# Patient Record
Sex: MALE | Race: WHITE | NOT HISPANIC OR LATINO | Employment: OTHER | URBAN - METROPOLITAN AREA
[De-identification: names, ages, dates, MRNs, and addresses within clinical notes are randomized per-mention and may not be internally consistent; named-entity substitution may affect disease eponyms.]

---

## 2020-05-21 RX ORDER — TADALAFIL 5 MG/1
5 TABLET ORAL DAILY PRN
Qty: 30 TABLET | Refills: 0 | Status: SHIPPED | OUTPATIENT
Start: 2020-05-21 | End: 2020-07-01 | Stop reason: SDUPTHER

## 2020-07-01 ENCOUNTER — OFFICE VISIT (OUTPATIENT)
Dept: UROLOGY | Facility: CLINIC | Age: 55
End: 2020-07-01
Payer: COMMERCIAL

## 2020-07-01 VITALS — DIASTOLIC BLOOD PRESSURE: 82 MMHG | HEART RATE: 106 BPM | SYSTOLIC BLOOD PRESSURE: 140 MMHG

## 2020-07-01 DIAGNOSIS — N40.0 BENIGN PROSTATIC HYPERPLASIA, UNSPECIFIED WHETHER LOWER URINARY TRACT SYMPTOMS PRESENT: Primary | ICD-10-CM

## 2020-07-01 LAB
POC RESIDUAL URINE VOLUME: 22 ML (ref 0–100)
PSA, DIAGNOSTIC: 1.21 NG/ML (ref 0–4)

## 2020-07-01 PROCEDURE — 51798 US URINE CAPACITY MEASURE: CPT | Mod: S$GLB,,, | Performed by: NURSE PRACTITIONER

## 2020-07-01 PROCEDURE — 99214 OFFICE O/P EST MOD 30 MIN: CPT | Mod: S$GLB,,, | Performed by: NURSE PRACTITIONER

## 2020-07-01 PROCEDURE — 51798 POCT BLADDER SCAN: ICD-10-PCS | Mod: S$GLB,,, | Performed by: NURSE PRACTITIONER

## 2020-07-01 PROCEDURE — 99214 PR OFFICE/OUTPT VISIT, EST, LEVL IV, 30-39 MIN: ICD-10-PCS | Mod: S$GLB,,, | Performed by: NURSE PRACTITIONER

## 2020-07-01 RX ORDER — AMLODIPINE AND BENAZEPRIL HYDROCHLORIDE 10; 40 MG/1; MG/1
CAPSULE ORAL
COMMUNITY
Start: 2020-06-30

## 2020-07-01 RX ORDER — TADALAFIL 5 MG/1
5 TABLET ORAL DAILY PRN
Qty: 30 TABLET | Refills: 0 | Status: SHIPPED | OUTPATIENT
Start: 2020-07-01 | End: 2020-07-29

## 2020-07-01 RX ORDER — FLUTICASONE FUROATE AND VILANTEROL TRIFENATATE 100; 25 UG/1; UG/1
POWDER RESPIRATORY (INHALATION)
COMMUNITY
Start: 2020-05-27 | End: 2023-07-07

## 2020-07-01 RX ORDER — TAMSULOSIN HYDROCHLORIDE 0.4 MG/1
0.4 CAPSULE ORAL DAILY
Qty: 90 CAPSULE | Refills: 3 | Status: SHIPPED | OUTPATIENT
Start: 2020-07-01 | End: 2021-06-29

## 2020-07-01 RX ORDER — PANTOPRAZOLE SODIUM 40 MG/1
TABLET, DELAYED RELEASE ORAL
COMMUNITY
Start: 2020-06-05

## 2020-07-01 NOTE — PROGRESS NOTES
Subjective:       Patient ID: Smooth Powell is a 55 y.o. male.    Chief Complaint: Other (yearly )      HPI: 55-year-old male known service Dr. Smith yearly follow-up history of BPH with obstruction and erectile dysfunction.  He is on Flomax 0.4 mg daily doing well.  No complaints about his urinary stream her voiding pattern.  Denies dysuria, frequency, urgency, incontinence or gross hematuria.  Erectile dysfunction--on Cialis 5 mg daily with good results.  Patient is testosterone was evaluated in the past that was slightly low but patient declined intervention.  Again today states he feels well with good libido good energy level and stamina.  He declines any further evaluation of that.  PSA was 1.4 in April of 2019 and 1.6 in March of 2018       Past Medical History:   Past Medical History:   Diagnosis Date    BPH with urinary obstruction     Erectile dysfunction        Past Surgical Historical:   Past Surgical History:   Procedure Laterality Date    NASAL SINUS SURGERY      TURBINATE RESECTION      VASECTOMY          Medications:   Medication List with Changes/Refills   Current Medications    AMLODIPINE-BENAZEPRIL (LOTREL) 10-40 MG PER CAPSULE        BREO ELLIPTA 100-25 MCG/DOSE DISKUS INHALER        PANTOPRAZOLE (PROTONIX) 40 MG TABLET        TADALAFIL (CIALIS) 5 MG TABLET    Take 1 tablet (5 mg total) by mouth daily as needed for Erectile Dysfunction.    TAMSULOSIN HCL (FLOMAX ORAL)    Take by mouth.        Past Social History:   Social History     Socioeconomic History    Marital status:      Spouse name: Not on file    Number of children: Not on file    Years of education: Not on file    Highest education level: Not on file   Occupational History    Not on file   Social Needs    Financial resource strain: Not on file    Food insecurity     Worry: Not on file     Inability: Not on file    Transportation needs     Medical: Not on file     Non-medical: Not on file   Tobacco Use    Smoking  status: Never Smoker    Smokeless tobacco: Never Used   Substance and Sexual Activity    Alcohol use: Yes     Frequency: Monthly or less    Drug use: Not on file    Sexual activity: Not on file   Lifestyle    Physical activity     Days per week: Not on file     Minutes per session: Not on file    Stress: Not on file   Relationships    Social connections     Talks on phone: Not on file     Gets together: Not on file     Attends [employer] service: Not on file     Active member of club or organization: Not on file     Attends meetings of clubs or organizations: Not on file     Relationship status: Not on file   Other Topics Concern    Not on file   Social History Narrative    Not on file       Allergies: Review of patient's allergies indicates:  No Known Allergies     Family History: History reviewed. No pertinent family history.     Review of Systems:  Review of Systems   Constitutional: Negative for activity change and appetite change.   HENT: Negative for congestion and dental problem.    Eyes: Negative for visual disturbance.   Respiratory: Negative for chest tightness and shortness of breath.    Cardiovascular: Negative for chest pain.   Gastrointestinal: Negative for abdominal distention and abdominal pain.   Genitourinary: Negative for decreased urine volume, difficulty urinating, discharge, dysuria, enuresis, flank pain, frequency, genital sores, hematuria, penile pain, penile swelling, scrotal swelling, testicular pain and urgency.   Musculoskeletal: Negative for back pain and neck pain.   Skin: Negative for color change.   Neurological: Negative for dizziness.   Hematological: Negative for adenopathy.   Psychiatric/Behavioral: Negative for agitation, behavioral problems and confusion.       Physical Exam:  Physical Exam   Constitutional: He is oriented to person, place, and time. He appears well-developed.   HENT:   Head: Normocephalic.   Eyes: No scleral icterus.   Neck: Normal range of motion.    Pulmonary/Chest: Effort normal and breath sounds normal.   Abdominal: Soft. He exhibits no distension. There is no abdominal tenderness. No hernia. Hernia confirmed negative in the right inguinal area and confirmed negative in the left inguinal area.   Genitourinary:    Testes and penis normal.   Prostate is enlarged ( symmetrical, smooth benign-feeling). Cremasteric reflex is present.   Neurological: He is alert and oriented to person, place, and time.   Skin: Skin is warm and dry.         Assessment/Plan:   BPH with LUTS--PVR 22 mL.  Refill Flomax.  Serum PSA today    Erectile dysfunction--refill Cialis    Problem List Items Addressed This Visit     None      Visit Diagnoses     Benign prostatic hyperplasia, unspecified whether lower urinary tract symptoms present    -  Primary    Relevant Orders    POCT Bladder Scan    Prostate Specific Antigen, Diagnostic

## 2020-09-09 RX ORDER — TADALAFIL 5 MG/1
5 TABLET ORAL DAILY PRN
Qty: 30 TABLET | Refills: 11 | Status: SHIPPED | OUTPATIENT
Start: 2020-09-09 | End: 2021-07-06 | Stop reason: SDUPTHER

## 2021-07-06 ENCOUNTER — OFFICE VISIT (OUTPATIENT)
Dept: UROLOGY | Facility: CLINIC | Age: 56
End: 2021-07-06
Payer: COMMERCIAL

## 2021-07-06 VITALS — HEIGHT: 72 IN | BODY MASS INDEX: 25.06 KG/M2 | WEIGHT: 185 LBS

## 2021-07-06 DIAGNOSIS — N40.0 BENIGN PROSTATIC HYPERPLASIA, UNSPECIFIED WHETHER LOWER URINARY TRACT SYMPTOMS PRESENT: Primary | ICD-10-CM

## 2021-07-06 DIAGNOSIS — N52.9 ERECTILE DYSFUNCTION, UNSPECIFIED ERECTILE DYSFUNCTION TYPE: ICD-10-CM

## 2021-07-06 LAB — PSA, DIAGNOSTIC: 1.11 NG/ML (ref 0–4)

## 2021-07-06 PROCEDURE — 3008F PR BODY MASS INDEX (BMI) DOCUMENTED: ICD-10-PCS | Mod: CPTII,S$GLB,, | Performed by: NURSE PRACTITIONER

## 2021-07-06 PROCEDURE — 99214 PR OFFICE/OUTPT VISIT, EST, LEVL IV, 30-39 MIN: ICD-10-PCS | Mod: S$GLB,,, | Performed by: NURSE PRACTITIONER

## 2021-07-06 PROCEDURE — 3008F BODY MASS INDEX DOCD: CPT | Mod: CPTII,S$GLB,, | Performed by: NURSE PRACTITIONER

## 2021-07-06 PROCEDURE — 99214 OFFICE O/P EST MOD 30 MIN: CPT | Mod: S$GLB,,, | Performed by: NURSE PRACTITIONER

## 2021-07-06 RX ORDER — TADALAFIL 5 MG/1
5 TABLET ORAL DAILY
Qty: 90 TABLET | Refills: 3 | Status: SHIPPED | OUTPATIENT
Start: 2021-07-06 | End: 2022-07-06

## 2021-07-06 RX ORDER — TAMSULOSIN HYDROCHLORIDE 0.4 MG/1
1 CAPSULE ORAL DAILY
Qty: 90 CAPSULE | Refills: 3 | Status: SHIPPED | OUTPATIENT
Start: 2021-07-06 | End: 2021-09-22

## 2021-07-06 RX ORDER — FLUTICASONE PROPIONATE AND SALMETEROL 250; 50 UG/1; UG/1
POWDER RESPIRATORY (INHALATION)
COMMUNITY
Start: 2021-07-03 | End: 2023-07-07

## 2021-09-02 ENCOUNTER — DOCUMENTATION ONLY (OUTPATIENT)
Dept: UROLOGY | Facility: CLINIC | Age: 56
End: 2021-09-02

## 2022-07-07 ENCOUNTER — OFFICE VISIT (OUTPATIENT)
Dept: UROLOGY | Facility: CLINIC | Age: 57
End: 2022-07-07
Payer: COMMERCIAL

## 2022-07-07 VITALS
HEIGHT: 72 IN | SYSTOLIC BLOOD PRESSURE: 137 MMHG | DIASTOLIC BLOOD PRESSURE: 89 MMHG | WEIGHT: 185 LBS | BODY MASS INDEX: 25.06 KG/M2

## 2022-07-07 DIAGNOSIS — N13.8 BPH WITH URINARY OBSTRUCTION: Primary | ICD-10-CM

## 2022-07-07 DIAGNOSIS — N52.9 ERECTILE DYSFUNCTION, UNSPECIFIED ERECTILE DYSFUNCTION TYPE: ICD-10-CM

## 2022-07-07 DIAGNOSIS — N40.1 BPH WITH URINARY OBSTRUCTION: Primary | ICD-10-CM

## 2022-07-07 LAB
POC RESIDUAL URINE VOLUME: 23 ML (ref 0–100)
PSA, DIAGNOSTIC: 1.27 NG/ML (ref 0–4)

## 2022-07-07 PROCEDURE — 1159F MED LIST DOCD IN RCRD: CPT | Mod: CPTII,S$GLB,, | Performed by: NURSE PRACTITIONER

## 2022-07-07 PROCEDURE — 1160F RVW MEDS BY RX/DR IN RCRD: CPT | Mod: CPTII,S$GLB,, | Performed by: NURSE PRACTITIONER

## 2022-07-07 PROCEDURE — 3079F PR MOST RECENT DIASTOLIC BLOOD PRESSURE 80-89 MM HG: ICD-10-PCS | Mod: CPTII,S$GLB,, | Performed by: NURSE PRACTITIONER

## 2022-07-07 PROCEDURE — 3075F SYST BP GE 130 - 139MM HG: CPT | Mod: CPTII,S$GLB,, | Performed by: NURSE PRACTITIONER

## 2022-07-07 PROCEDURE — 1159F PR MEDICATION LIST DOCUMENTED IN MEDICAL RECORD: ICD-10-PCS | Mod: CPTII,S$GLB,, | Performed by: NURSE PRACTITIONER

## 2022-07-07 PROCEDURE — 3079F DIAST BP 80-89 MM HG: CPT | Mod: CPTII,S$GLB,, | Performed by: NURSE PRACTITIONER

## 2022-07-07 PROCEDURE — 51798 POCT BLADDER SCAN: ICD-10-PCS | Mod: S$GLB,,, | Performed by: NURSE PRACTITIONER

## 2022-07-07 PROCEDURE — 1160F PR REVIEW ALL MEDS BY PRESCRIBER/CLIN PHARMACIST DOCUMENTED: ICD-10-PCS | Mod: CPTII,S$GLB,, | Performed by: NURSE PRACTITIONER

## 2022-07-07 PROCEDURE — 99214 OFFICE O/P EST MOD 30 MIN: CPT | Mod: S$GLB,,, | Performed by: NURSE PRACTITIONER

## 2022-07-07 PROCEDURE — 3008F BODY MASS INDEX DOCD: CPT | Mod: CPTII,S$GLB,, | Performed by: NURSE PRACTITIONER

## 2022-07-07 PROCEDURE — 3075F PR MOST RECENT SYSTOLIC BLOOD PRESS GE 130-139MM HG: ICD-10-PCS | Mod: CPTII,S$GLB,, | Performed by: NURSE PRACTITIONER

## 2022-07-07 PROCEDURE — 4010F ACE/ARB THERAPY RXD/TAKEN: CPT | Mod: CPTII,S$GLB,, | Performed by: NURSE PRACTITIONER

## 2022-07-07 PROCEDURE — 4010F PR ACE/ARB THEARPY RXD/TAKEN: ICD-10-PCS | Mod: CPTII,S$GLB,, | Performed by: NURSE PRACTITIONER

## 2022-07-07 PROCEDURE — 3008F PR BODY MASS INDEX (BMI) DOCUMENTED: ICD-10-PCS | Mod: CPTII,S$GLB,, | Performed by: NURSE PRACTITIONER

## 2022-07-07 PROCEDURE — 51798 US URINE CAPACITY MEASURE: CPT | Mod: S$GLB,,, | Performed by: NURSE PRACTITIONER

## 2022-07-07 PROCEDURE — 99214 PR OFFICE/OUTPT VISIT, EST, LEVL IV, 30-39 MIN: ICD-10-PCS | Mod: S$GLB,,, | Performed by: NURSE PRACTITIONER

## 2022-07-07 RX ORDER — CLONIDINE HYDROCHLORIDE 0.2 MG/1
0.2 TABLET ORAL 2 TIMES DAILY
COMMUNITY
Start: 2022-04-28

## 2022-07-07 RX ORDER — TADALAFIL 5 MG/1
5 TABLET ORAL DAILY PRN
Qty: 90 TABLET | Refills: 3 | Status: SHIPPED | OUTPATIENT
Start: 2022-07-07 | End: 2022-08-26

## 2022-07-07 RX ORDER — TAMSULOSIN HYDROCHLORIDE 0.4 MG/1
0.4 CAPSULE ORAL DAILY
Qty: 90 CAPSULE | Refills: 3 | Status: SHIPPED | OUTPATIENT
Start: 2022-07-07 | End: 2023-07-07

## 2022-07-07 NOTE — PROGRESS NOTES
Subjective:       Patient ID: Smooth Powell is a 57 y.o. male.    Chief Complaint: Benign Prostatic Hypertrophy      HPI: 57-year-old male, established patient, presents for yearly visit.  Patient has history BPH with obstruction and erectile dysfunction.  Patient is on Flomax 0.4 mg daily and generic Cialis 5 mg daily.    Patient states he is doing well.  States he is doing well on both medications.  Denies any pain or burning urination.  States he has a good stream from start to finish.  Denies having strain to void.  Denies any significant frequency, urgency, or nocturia.  Denies any significant weight loss.  Denies any blood in urine.  Denies any fever body aches.  States his erections are good with Cialis 5 mg daily.    No other urinary complaints at this time.       Past Medical History:   Past Medical History:   Diagnosis Date    BPH with urinary obstruction     Erectile dysfunction        Past Surgical Historical:   Past Surgical History:   Procedure Laterality Date    NASAL SINUS SURGERY      TURBINATE RESECTION      VASECTOMY          Medications:   Medication List with Changes/Refills   New Medications    TADALAFIL (CIALIS) 5 MG TABLET    Take 1 tablet (5 mg total) by mouth daily as needed for Erectile Dysfunction.    TAMSULOSIN (FLOMAX) 0.4 MG CAP    Take 1 capsule (0.4 mg total) by mouth once daily.   Current Medications    AMLODIPINE-BENAZEPRIL (LOTREL) 10-40 MG PER CAPSULE        BREO ELLIPTA 100-25 MCG/DOSE DISKUS INHALER        CLONIDINE (CATAPRES) 0.2 MG TABLET    Take 0.2 mg by mouth 2 (two) times daily.    FLUTICASONE-SALMETEROL DISKUS INHALER 250-50 MCG        PANTOPRAZOLE (PROTONIX) 40 MG TABLET        TADALAFIL (CIALIS) 5 MG TABLET    Take 1 tablet (5 mg total) by mouth Daily.   Changed and/or Refilled Medications    Modified Medication Previous Medication    TAMSULOSIN (FLOMAX) 0.4 MG CAP tamsulosin (FLOMAX) 0.4 mg Cap       Take 1 capsule by mouth once daily    Take 1 capsule by mouth  once daily        Past Social History:   Social History     Socioeconomic History    Marital status:    Tobacco Use    Smoking status: Never Smoker    Smokeless tobacco: Never Used   Substance and Sexual Activity    Alcohol use: Yes       Allergies: Review of patient's allergies indicates:  No Known Allergies     Family History: History reviewed. No pertinent family history.     Review of Systems:  Review of Systems   Constitutional: Negative for activity change and appetite change.   HENT: Negative for congestion and dental problem.    Eyes: Negative for visual disturbance.   Respiratory: Negative for chest tightness and shortness of breath.    Cardiovascular: Negative for chest pain.   Gastrointestinal: Negative for abdominal distention and abdominal pain.   Genitourinary: Negative for decreased urine volume, difficulty urinating, dysuria, enuresis, flank pain, frequency, genital sores, hematuria, penile discharge, penile pain, penile swelling, scrotal swelling, testicular pain and urgency.   Musculoskeletal: Negative for back pain and neck pain.   Skin: Negative for color change.   Neurological: Negative for dizziness.   Hematological: Negative for adenopathy.   Psychiatric/Behavioral: Negative for agitation, behavioral problems and confusion.       Physical Exam:  Physical Exam  Vitals and nursing note reviewed.   Constitutional:       Appearance: He is well-developed.   HENT:      Head: Normocephalic.   Eyes:      Pupils: Pupils are equal, round, and reactive to light.   Cardiovascular:      Rate and Rhythm: Normal rate and regular rhythm.      Heart sounds: Normal heart sounds.   Pulmonary:      Effort: Pulmonary effort is normal.      Breath sounds: Normal breath sounds.   Abdominal:      General: Bowel sounds are normal.      Palpations: Abdomen is soft.   Genitourinary:     Penis: Normal.       Prostate: Enlarged.      Rectum: Normal.      Comments: Prostate is enlarged.  Prostate smooth with no  nodules and nontender.  Prostate is symmetrical.  Musculoskeletal:         General: Normal range of motion.      Cervical back: Normal range of motion and neck supple.   Skin:     General: Skin is warm and dry.   Neurological:      Mental Status: He is alert and oriented to person, place, and time.   Psychiatric:         Behavior: Behavior normal.       Bladder scan:  23 cc    Assessment/Plan:   1. BPH with obstruction:  Will check PSA.  We will notify patient of the results.  Patient is doing Flomax 0.4 mg daily and 5 mg Cialis daily.  Patient will continue medications as directed.    2. Erectile dysfunction:  Patient responding well to Cialis 5 mg daily.  Patient continue as directed.    Plan follow-up in 1 year, sooner if needed.  Problem List Items Addressed This Visit    None     Visit Diagnoses     BPH with urinary obstruction    -  Primary    Relevant Medications    tadalafiL (CIALIS) 5 MG tablet    tamsulosin (FLOMAX) 0.4 mg Cap    Other Relevant Orders    Prostate Specific Antigen, Diagnostic    POCT Bladder Scan (Completed)    Erectile dysfunction, unspecified erectile dysfunction type        Relevant Medications    tadalafiL (CIALIS) 5 MG tablet

## 2022-07-08 ENCOUNTER — TELEPHONE (OUTPATIENT)
Dept: UROLOGY | Facility: CLINIC | Age: 57
End: 2022-07-08
Payer: COMMERCIAL

## 2022-07-08 NOTE — TELEPHONE ENCOUNTER
Notified pt of psa results. Pt stated that he understood. ED----- Message from Lupillo Baptiste NP sent at 7/7/2022 12:56 PM CDT -----  PSA is WNL

## 2023-01-30 ENCOUNTER — TELEPHONE (OUTPATIENT)
Dept: UROLOGY | Facility: CLINIC | Age: 58
End: 2023-01-30
Payer: COMMERCIAL

## 2023-01-30 RX ORDER — TADALAFIL 20 MG/1
20 TABLET ORAL DAILY PRN
Qty: 10 TABLET | Refills: 11 | Status: SHIPPED | OUTPATIENT
Start: 2023-01-30 | End: 2023-07-07

## 2023-01-30 NOTE — TELEPHONE ENCOUNTER
Called patient and made aware to take 20 mg medication as needed only not a daily medication. Patient verbalized understanding.

## 2023-01-30 NOTE — TELEPHONE ENCOUNTER
----- Message from Palak Boykin sent at 1/30/2023  8:05 AM CST -----  Contact: pt  Pt calling about tadalafiL (CIALIS) 5 MG.  This script is not working and would like it increased to about 20 mg.  Pt can be reached at [phone]  Pt would like to keep 90 day supply      [facility] [address] [place], [place] - [address] [facility]  [address] [address]  Phone: [phone] Fax: [phone]    Thanks,

## 2023-07-07 ENCOUNTER — OFFICE VISIT (OUTPATIENT)
Dept: UROLOGY | Facility: CLINIC | Age: 58
End: 2023-07-07
Payer: COMMERCIAL

## 2023-07-07 VITALS — DIASTOLIC BLOOD PRESSURE: 84 MMHG | HEART RATE: 85 BPM | SYSTOLIC BLOOD PRESSURE: 137 MMHG

## 2023-07-07 DIAGNOSIS — N40.1 BPH WITH URINARY OBSTRUCTION: Primary | ICD-10-CM

## 2023-07-07 DIAGNOSIS — R35.1 NOCTURIA: ICD-10-CM

## 2023-07-07 DIAGNOSIS — N13.8 BPH WITH URINARY OBSTRUCTION: Primary | ICD-10-CM

## 2023-07-07 DIAGNOSIS — N52.9 ERECTILE DYSFUNCTION, UNSPECIFIED ERECTILE DYSFUNCTION TYPE: ICD-10-CM

## 2023-07-07 LAB — PSA, DIAGNOSTIC: 1.48 NG/ML (ref 0.1–4)

## 2023-07-07 PROCEDURE — 3079F DIAST BP 80-89 MM HG: CPT | Mod: CPTII,S$GLB,, | Performed by: NURSE PRACTITIONER

## 2023-07-07 PROCEDURE — 3075F SYST BP GE 130 - 139MM HG: CPT | Mod: CPTII,S$GLB,, | Performed by: NURSE PRACTITIONER

## 2023-07-07 PROCEDURE — 3075F PR MOST RECENT SYSTOLIC BLOOD PRESS GE 130-139MM HG: ICD-10-PCS | Mod: CPTII,S$GLB,, | Performed by: NURSE PRACTITIONER

## 2023-07-07 PROCEDURE — 99214 OFFICE O/P EST MOD 30 MIN: CPT | Mod: S$GLB,,, | Performed by: NURSE PRACTITIONER

## 2023-07-07 PROCEDURE — 3079F PR MOST RECENT DIASTOLIC BLOOD PRESSURE 80-89 MM HG: ICD-10-PCS | Mod: CPTII,S$GLB,, | Performed by: NURSE PRACTITIONER

## 2023-07-07 PROCEDURE — 4010F ACE/ARB THERAPY RXD/TAKEN: CPT | Mod: CPTII,S$GLB,, | Performed by: NURSE PRACTITIONER

## 2023-07-07 PROCEDURE — 4010F PR ACE/ARB THEARPY RXD/TAKEN: ICD-10-PCS | Mod: CPTII,S$GLB,, | Performed by: NURSE PRACTITIONER

## 2023-07-07 PROCEDURE — 1159F PR MEDICATION LIST DOCUMENTED IN MEDICAL RECORD: ICD-10-PCS | Mod: CPTII,S$GLB,, | Performed by: NURSE PRACTITIONER

## 2023-07-07 PROCEDURE — 36415 COLL VENOUS BLD VENIPUNCTURE: CPT | Mod: S$GLB,,, | Performed by: NURSE PRACTITIONER

## 2023-07-07 PROCEDURE — 36415 PR COLLECTION VENOUS BLOOD,VENIPUNCTURE: ICD-10-PCS | Mod: S$GLB,,, | Performed by: NURSE PRACTITIONER

## 2023-07-07 PROCEDURE — 1160F RVW MEDS BY RX/DR IN RCRD: CPT | Mod: CPTII,S$GLB,, | Performed by: NURSE PRACTITIONER

## 2023-07-07 PROCEDURE — 99214 PR OFFICE/OUTPT VISIT, EST, LEVL IV, 30-39 MIN: ICD-10-PCS | Mod: S$GLB,,, | Performed by: NURSE PRACTITIONER

## 2023-07-07 PROCEDURE — 1159F MED LIST DOCD IN RCRD: CPT | Mod: CPTII,S$GLB,, | Performed by: NURSE PRACTITIONER

## 2023-07-07 PROCEDURE — 1160F PR REVIEW ALL MEDS BY PRESCRIBER/CLIN PHARMACIST DOCUMENTED: ICD-10-PCS | Mod: CPTII,S$GLB,, | Performed by: NURSE PRACTITIONER

## 2023-07-07 RX ORDER — TADALAFIL 20 MG/1
20 TABLET ORAL DAILY PRN
Qty: 20 TABLET | Refills: 11 | Status: SHIPPED | OUTPATIENT
Start: 2023-07-07 | End: 2024-07-06

## 2023-07-07 RX ORDER — OXYBUTYNIN CHLORIDE 5 MG/1
5 TABLET ORAL DAILY PRN
Qty: 120 TABLET | Refills: 3 | Status: SHIPPED | OUTPATIENT
Start: 2023-07-07 | End: 2024-07-06

## 2023-07-07 RX ORDER — NEBIVOLOL 5 MG/1
TABLET ORAL
COMMUNITY
Start: 2023-05-23

## 2023-07-07 RX ORDER — TAMSULOSIN HYDROCHLORIDE 0.4 MG/1
0.4 CAPSULE ORAL DAILY
Qty: 90 CAPSULE | Refills: 3 | Status: SHIPPED | OUTPATIENT
Start: 2023-07-07 | End: 2024-07-06

## 2023-07-07 RX ORDER — DUPILUMAB 300 MG/2ML
INJECTION, SOLUTION SUBCUTANEOUS
COMMUNITY
Start: 2023-07-05

## 2023-07-07 NOTE — PROGRESS NOTES
Subjective:       Patient ID: Smooth Powell is a 58 y.o. male.    Chief Complaint: Benign Prostatic Hypertrophy and Erectile Dysfunction      HPI: 58-year-old male, established patient, presents for yearly visit.    Patient has a history BPH with obstruction.  He is maintained on Flomax 0.4 mg daily.    Patient has history of erectile dysfunction.  He is maintained on tadalafil 20 mg as needed.      Patient is complaining of nocturia.  States he has up to 5 episodes per night.    Some nights are worse than others.  He has cut out his use of tea.  He does admit to some occasional alcohol use.  Denies any pain or burning urination.  Denies any frequency or urgency during the day.  Denies any odor to the urine.  Denies any fever or body aches.  States he has a pretty good stream.    No other urinary complaints at this time.       Past Medical History:   Past Medical History:   Diagnosis Date    BPH with urinary obstruction     Erectile dysfunction        Past Surgical Historical:   Past Surgical History:   Procedure Laterality Date    NASAL SINUS SURGERY      TURBINATE RESECTION      VASECTOMY          Medications:   Medication List with Changes/Refills   New Medications    OXYBUTYNIN (DITROPAN) 5 MG TAB    Take 1 tablet (5 mg total) by mouth daily as needed (1-2 tabs daily as needed.).   Current Medications    AMLODIPINE-BENAZEPRIL (LOTREL) 10-40 MG PER CAPSULE        BREO ELLIPTA 100-25 MCG/DOSE DISKUS INHALER        CLONIDINE (CATAPRES) 0.2 MG TABLET    Take 0.2 mg by mouth 2 (two) times daily.    DUPIXENT  MG/2 ML PNIJ        FLUTICASONE-SALMETEROL DISKUS INHALER 250-50 MCG        NEBIVOLOL (BYSTOLIC) 5 MG TAB        PANTOPRAZOLE (PROTONIX) 40 MG TABLET       Changed and/or Refilled Medications    Modified Medication Previous Medication    TADALAFIL (CIALIS) 20 MG TAB tadalafiL (CIALIS) 20 MG Tab       Take 1 tablet (20 mg total) by mouth daily as needed (ed).    Take 1 tablet (20 mg total) by mouth daily  as needed (ed).    TAMSULOSIN (FLOMAX) 0.4 MG CAP tamsulosin (FLOMAX) 0.4 mg Cap       Take 1 capsule (0.4 mg total) by mouth once daily.    Take 1 capsule (0.4 mg total) by mouth once daily.   Discontinued Medications    TADALAFIL (CIALIS) 5 MG TABLET    Take 1 tablet by mouth once daily    TAMSULOSIN (FLOMAX) 0.4 MG CAP    Take 1 capsule by mouth once daily        Past Social History:   Social History     Socioeconomic History    Marital status:    Tobacco Use    Smoking status: Never    Smokeless tobacco: Never   Substance and Sexual Activity    Alcohol use: Yes       Allergies: Review of patient's allergies indicates:  No Known Allergies     Family History: History reviewed. No pertinent family history.     Review of Systems:  Review of Systems   Constitutional:  Negative for activity change and appetite change.   HENT:  Negative for congestion and dental problem.    Eyes:  Negative for visual disturbance.   Respiratory:  Negative for chest tightness and shortness of breath.    Cardiovascular:  Negative for chest pain.   Gastrointestinal:  Negative for abdominal distention and abdominal pain.   Genitourinary:  Negative for decreased urine volume, difficulty urinating, dysuria, enuresis, flank pain, frequency, genital sores, hematuria, penile discharge, penile pain, penile swelling, scrotal swelling, testicular pain and urgency.   Musculoskeletal:  Negative for back pain and neck pain.   Skin:  Negative for color change.   Neurological:  Negative for dizziness.   Hematological:  Negative for adenopathy.   Psychiatric/Behavioral:  Negative for agitation, behavioral problems and confusion.      Physical Exam:  Physical Exam  Vitals and nursing note reviewed.   Constitutional:       Appearance: He is well-developed.   HENT:      Head: Normocephalic.   Eyes:      Pupils: Pupils are equal, round, and reactive to light.   Cardiovascular:      Rate and Rhythm: Normal rate and regular rhythm.      Heart sounds:  Normal heart sounds.   Pulmonary:      Effort: Pulmonary effort is normal.      Breath sounds: Normal breath sounds.   Abdominal:      General: Bowel sounds are normal.      Palpations: Abdomen is soft.   Genitourinary:     Penis: Normal.       Prostate: Enlarged (Prostate is enlarged.  Prostate smooth with no nodules and nontender.  Prostate is symmetrical.).      Rectum: Normal.   Musculoskeletal:         General: Normal range of motion.      Cervical back: Normal range of motion and neck supple.   Skin:     General: Skin is warm and dry.   Neurological:      Mental Status: He is alert and oriented to person, place, and time.   Psychiatric:         Behavior: Behavior normal.     Bladder scan:  11 cc    Assessment/Plan:   1. BPH with obstruction:  Check the patient's PSA.  We will notify him of the results.    Scan good at 11 cc.  Patient will continue Flomax 0.4 mg daily.    Refill sent to pharmacy.      2. Erectile dysfunction:  Patient is doing tadalafil 20 mg as needed.    Refill sent to pharmacy.      3. Nocturia:  Bladder scan is good at 11 cc.    Patient has already made some dietary and behavioral modifications but symptoms persist.    Will start patient on oxybutynin 5 mg nightly.  Patient will start with 1 tab and increase to 2 tabs if needed.    If no improvement he will notify us for further options.      Will tentatively plan follow-up in 1 year, sooner if needed.  Problem List Items Addressed This Visit    None  Visit Diagnoses       BPH with urinary obstruction    -  Primary    Relevant Medications    tamsulosin (FLOMAX) 0.4 mg Cap    Other Relevant Orders    Prostate Specific Antigen, Diagnostic    Erectile dysfunction, unspecified erectile dysfunction type        Relevant Medications    tadalafiL (CIALIS) 20 MG Tab    Nocturia        Relevant Medications    oxybutynin (DITROPAN) 5 MG Tab

## 2024-07-10 ENCOUNTER — OFFICE VISIT (OUTPATIENT)
Dept: UROLOGY | Facility: CLINIC | Age: 59
End: 2024-07-10
Payer: COMMERCIAL

## 2024-07-10 ENCOUNTER — TELEPHONE (OUTPATIENT)
Dept: UROLOGY | Facility: CLINIC | Age: 59
End: 2024-07-10

## 2024-07-10 VITALS
HEART RATE: 65 BPM | SYSTOLIC BLOOD PRESSURE: 120 MMHG | WEIGHT: 187 LBS | HEIGHT: 73 IN | DIASTOLIC BLOOD PRESSURE: 78 MMHG | OXYGEN SATURATION: 98 % | BODY MASS INDEX: 24.78 KG/M2

## 2024-07-10 DIAGNOSIS — N13.8 BPH WITH URINARY OBSTRUCTION: ICD-10-CM

## 2024-07-10 DIAGNOSIS — N52.9 ERECTILE DYSFUNCTION, UNSPECIFIED ERECTILE DYSFUNCTION TYPE: ICD-10-CM

## 2024-07-10 DIAGNOSIS — R35.1 NOCTURIA: ICD-10-CM

## 2024-07-10 DIAGNOSIS — N40.1 BPH WITH URINARY OBSTRUCTION: ICD-10-CM

## 2024-07-10 LAB
BILIRUBIN, UA POC OHS: NEGATIVE
BLOOD, UA POC OHS: NEGATIVE
CLARITY, UA POC OHS: CLEAR
COLOR, UA POC OHS: YELLOW
GLUCOSE, UA POC OHS: NEGATIVE
KETONES, UA POC OHS: NEGATIVE
LEUKOCYTES, UA POC OHS: NEGATIVE
NITRITE, UA POC OHS: NEGATIVE
PH, UA POC OHS: 6.5
PROTEIN, UA POC OHS: NEGATIVE
PSA, DIAGNOSTIC: 1.97 NG/ML (ref 0.1–4)
SPECIFIC GRAVITY, UA POC OHS: 1.02
UROBILINOGEN, UA POC OHS: 0.2

## 2024-07-10 PROCEDURE — 1160F RVW MEDS BY RX/DR IN RCRD: CPT | Mod: CPTII,S$GLB,, | Performed by: NURSE PRACTITIONER

## 2024-07-10 PROCEDURE — 4010F ACE/ARB THERAPY RXD/TAKEN: CPT | Mod: CPTII,S$GLB,, | Performed by: NURSE PRACTITIONER

## 2024-07-10 PROCEDURE — 3008F BODY MASS INDEX DOCD: CPT | Mod: CPTII,S$GLB,, | Performed by: NURSE PRACTITIONER

## 2024-07-10 PROCEDURE — 3074F SYST BP LT 130 MM HG: CPT | Mod: CPTII,S$GLB,, | Performed by: NURSE PRACTITIONER

## 2024-07-10 PROCEDURE — 99214 OFFICE O/P EST MOD 30 MIN: CPT | Mod: S$GLB,,, | Performed by: NURSE PRACTITIONER

## 2024-07-10 PROCEDURE — 1159F MED LIST DOCD IN RCRD: CPT | Mod: CPTII,S$GLB,, | Performed by: NURSE PRACTITIONER

## 2024-07-10 PROCEDURE — 81003 URINALYSIS AUTO W/O SCOPE: CPT | Mod: QW,S$GLB,, | Performed by: NURSE PRACTITIONER

## 2024-07-10 PROCEDURE — 3078F DIAST BP <80 MM HG: CPT | Mod: CPTII,S$GLB,, | Performed by: NURSE PRACTITIONER

## 2024-07-10 RX ORDER — TADALAFIL 20 MG/1
20 TABLET ORAL DAILY PRN
Qty: 20 TABLET | Refills: 11 | Status: SHIPPED | OUTPATIENT
Start: 2024-07-10 | End: 2025-07-10

## 2024-07-10 RX ORDER — TAMSULOSIN HYDROCHLORIDE 0.4 MG/1
0.4 CAPSULE ORAL DAILY
Qty: 90 CAPSULE | Refills: 3 | Status: SHIPPED | OUTPATIENT
Start: 2024-07-10 | End: 2025-07-10

## 2024-07-10 RX ORDER — OXYBUTYNIN CHLORIDE 5 MG/1
5 TABLET ORAL DAILY PRN
Qty: 120 TABLET | Refills: 3 | Status: SHIPPED | OUTPATIENT
Start: 2024-07-10 | End: 2025-07-10

## 2024-07-10 NOTE — PROGRESS NOTES
Subjective:       Patient ID: Smooth Powell is a 59 y.o. male.    Chief Complaint: Benign Prostatic Hypertrophy      HPI: 59-year-old male, established patient, presents for yearly visit.    Patient has a history of BPH with obstruction.  He is maintained on tamsulosin 0.4 mg daily.    He also has nocturia.  Patient was started on oxybutynin 5 mg nightly at last visit.  Patient states he has had improvement.  Was getting up 5 times per night.  Now he is getting up about 1-2 times per night.    He has a history of erectile dysfunction.  Maintained on tadalafil 20 mg daily as needed.  States is working well no complaints.    Denies any pain or burning urination.  Denies any odor to the urine.  Denies any fever or body aches.  Denies any blood in urine.  Denies any significant frequency or urgency.  Denies any unexpected weight loss.    No other urinary complaints at this time.         Past Medical History:   Past Medical History:   Diagnosis Date    BPH with urinary obstruction     Erectile dysfunction        Past Surgical Historical:   Past Surgical History:   Procedure Laterality Date    NASAL SINUS SURGERY      TURBINATE RESECTION      VASECTOMY          Medications:   Medication List with Changes/Refills   Current Medications    AMLODIPINE-BENAZEPRIL (LOTREL) 10-40 MG PER CAPSULE        CLONIDINE (CATAPRES) 0.2 MG TABLET    Take 0.2 mg by mouth 2 (two) times daily.    DUPIXENT  MG/2 ML PNIJ        NEBIVOLOL (BYSTOLIC) 5 MG TAB        PANTOPRAZOLE (PROTONIX) 40 MG TABLET       Changed and/or Refilled Medications    Modified Medication Previous Medication    OXYBUTYNIN (DITROPAN) 5 MG TAB oxybutynin (DITROPAN) 5 MG Tab       Take 1 tablet (5 mg total) by mouth daily as needed (1-2 tabs daily as needed.).    Take 1 tablet (5 mg total) by mouth daily as needed (1-2 tabs daily as needed.).    TADALAFIL (CIALIS) 20 MG TAB tadalafiL (CIALIS) 20 MG Tab       Take 1 tablet (20 mg total) by mouth daily as needed  (ed).    Take 1 tablet (20 mg total) by mouth daily as needed (ed).    TAMSULOSIN (FLOMAX) 0.4 MG CAP tamsulosin (FLOMAX) 0.4 mg Cap       Take 1 capsule (0.4 mg total) by mouth once daily.    Take 1 capsule (0.4 mg total) by mouth once daily.        Past Social History:   Social History     Socioeconomic History    Marital status:    Tobacco Use    Smoking status: Never     Passive exposure: Never    Smokeless tobacco: Never   Substance and Sexual Activity    Alcohol use: Yes    Drug use: Never    Sexual activity: Yes       Allergies: Review of patient's allergies indicates:  No Known Allergies     Family History: No family history on file.     Review of Systems:  Review of Systems   Constitutional:  Negative for activity change and appetite change.   HENT:  Negative for congestion and dental problem.    Eyes:  Negative for visual disturbance.   Respiratory:  Negative for chest tightness and shortness of breath.    Cardiovascular:  Negative for chest pain.   Gastrointestinal:  Negative for abdominal distention and abdominal pain.   Genitourinary:  Negative for decreased urine volume, difficulty urinating, dysuria, enuresis, flank pain, frequency, genital sores, hematuria, penile discharge, penile pain, penile swelling, scrotal swelling, testicular pain and urgency.   Musculoskeletal:  Negative for back pain and neck pain.   Skin:  Negative for color change.   Neurological:  Negative for dizziness.   Hematological:  Negative for adenopathy.   Psychiatric/Behavioral:  Negative for agitation, behavioral problems and confusion.        Physical Exam:  Physical Exam  Vitals and nursing note reviewed.   Constitutional:       Appearance: He is well-developed.   HENT:      Head: Normocephalic.   Eyes:      Pupils: Pupils are equal, round, and reactive to light.   Cardiovascular:      Rate and Rhythm: Normal rate and regular rhythm.      Heart sounds: Normal heart sounds.   Pulmonary:      Effort: Pulmonary effort is  normal.      Breath sounds: Normal breath sounds.   Abdominal:      General: Bowel sounds are normal.      Palpations: Abdomen is soft.   Genitourinary:     Penis: Normal.       Prostate: Enlarged (Prostate is enlarged.  Prostate smooth with no nodules and nontender, and is symmetrical.).      Rectum: Normal.   Musculoskeletal:         General: Normal range of motion.      Cervical back: Normal range of motion and neck supple.   Skin:     General: Skin is warm and dry.   Neurological:      Mental Status: He is alert and oriented to person, place, and time.   Psychiatric:         Behavior: Behavior normal.       Urinalysis: Normal    Assessment/Plan:   1. BPH with obstruction: Check the patient's PSA.  We will notify him of the results.    Patient is doing Flomax 0.4 mg daily.  Refill sent to pharmacy.      2. Nocturia: Patient has had improvement with oxybutynin 5 mg nightly.    Patient will continue as directed.  Refill sent to pharmacy.      3. Erectile dysfunction:  Patient is doing tadalafil 20 mg as directed.    Refill sent to pharmacy.      Follow-up 1 year, sooner if needed.  Problem List Items Addressed This Visit    None  Visit Diagnoses       BPH with urinary obstruction        Relevant Medications    tamsulosin (FLOMAX) 0.4 mg Cap    Other Relevant Orders    Prostate Specific Antigen, Diagnostic    POCT Urinalysis(Instrument)    Erectile dysfunction, unspecified erectile dysfunction type        Relevant Medications    tadalafiL (CIALIS) 20 MG Tab    Nocturia        Relevant Medications    oxybutynin (DITROPAN) 5 MG Tab    Other Relevant Orders    POCT Urinalysis(Instrument)

## 2024-07-10 NOTE — TELEPHONE ENCOUNTER
Attempted to contact pt regarding results. Left VM.    ----- Message from Lupillo Baptiste NP sent at 7/10/2024  3:48 PM CDT -----  PSA is in the normal range with no significant change from 1 year ago.  PSA was 1.48 in his now 1.97

## 2024-07-11 ENCOUNTER — TELEPHONE (OUTPATIENT)
Dept: UROLOGY | Facility: CLINIC | Age: 59
End: 2024-07-11
Payer: COMMERCIAL

## 2024-07-11 NOTE — TELEPHONE ENCOUNTER
Spoke with pt and informed of results.    ----- Message from Dylon Bass sent at 7/11/2024  8:05 AM CDT -----  .Type:  Patient Returning Call    Who Called:Smooth  Who Left Message for Patient:Nurse  Does the patient know what this is regarding?:Yes  Would the patient rather a call back or a response via [facility]? Call back  Best Call Back Number:.[phone]  Additional Information: Na        Thanks  LINDA

## 2025-05-13 ENCOUNTER — TELEPHONE (OUTPATIENT)
Dept: UROLOGY | Facility: CLINIC | Age: 60
End: 2025-05-13
Payer: COMMERCIAL

## 2025-07-01 DIAGNOSIS — N40.1 BPH WITH URINARY OBSTRUCTION: ICD-10-CM

## 2025-07-01 DIAGNOSIS — N13.8 BPH WITH URINARY OBSTRUCTION: ICD-10-CM

## 2025-07-03 RX ORDER — TAMSULOSIN HYDROCHLORIDE 0.4 MG/1
1 CAPSULE ORAL
Qty: 90 CAPSULE | Refills: 0 | Status: SHIPPED | OUTPATIENT
Start: 2025-07-03

## 2025-07-22 ENCOUNTER — OFFICE VISIT (OUTPATIENT)
Dept: UROLOGY | Facility: CLINIC | Age: 60
End: 2025-07-22
Payer: COMMERCIAL

## 2025-07-22 VITALS
SYSTOLIC BLOOD PRESSURE: 116 MMHG | HEIGHT: 72 IN | WEIGHT: 185 LBS | HEART RATE: 69 BPM | BODY MASS INDEX: 25.06 KG/M2 | DIASTOLIC BLOOD PRESSURE: 74 MMHG

## 2025-07-22 DIAGNOSIS — N40.1 BPH WITH URINARY OBSTRUCTION: Primary | ICD-10-CM

## 2025-07-22 DIAGNOSIS — N52.9 ERECTILE DYSFUNCTION, UNSPECIFIED ERECTILE DYSFUNCTION TYPE: ICD-10-CM

## 2025-07-22 DIAGNOSIS — N13.8 BPH WITH URINARY OBSTRUCTION: Primary | ICD-10-CM

## 2025-07-22 DIAGNOSIS — R35.1 NOCTURIA: ICD-10-CM

## 2025-07-22 LAB
POC RESIDUAL URINE VOLUME: 35 ML (ref 0–100)
PSA, DIAGNOSTIC: 1.73 NG/ML (ref 0–4)

## 2025-07-22 PROCEDURE — 3078F DIAST BP <80 MM HG: CPT | Mod: CPTII,,, | Performed by: NURSE PRACTITIONER

## 2025-07-22 PROCEDURE — 99214 OFFICE O/P EST MOD 30 MIN: CPT | Mod: S$PBB,,, | Performed by: NURSE PRACTITIONER

## 2025-07-22 PROCEDURE — 1160F RVW MEDS BY RX/DR IN RCRD: CPT | Mod: CPTII,,, | Performed by: NURSE PRACTITIONER

## 2025-07-22 PROCEDURE — 3074F SYST BP LT 130 MM HG: CPT | Mod: CPTII,,, | Performed by: NURSE PRACTITIONER

## 2025-07-22 PROCEDURE — 4010F ACE/ARB THERAPY RXD/TAKEN: CPT | Mod: CPTII,,, | Performed by: NURSE PRACTITIONER

## 2025-07-22 PROCEDURE — 1159F MED LIST DOCD IN RCRD: CPT | Mod: CPTII,,, | Performed by: NURSE PRACTITIONER

## 2025-07-22 PROCEDURE — 3008F BODY MASS INDEX DOCD: CPT | Mod: CPTII,,, | Performed by: NURSE PRACTITIONER

## 2025-07-22 RX ORDER — TADALAFIL 20 MG/1
20 TABLET ORAL DAILY PRN
Qty: 20 TABLET | Refills: 11 | Status: SHIPPED | OUTPATIENT
Start: 2025-07-22 | End: 2026-07-22

## 2025-07-22 RX ORDER — TAMSULOSIN HYDROCHLORIDE 0.4 MG/1
1 CAPSULE ORAL DAILY
Qty: 90 CAPSULE | Refills: 3 | Status: SHIPPED | OUTPATIENT
Start: 2025-07-22 | End: 2026-07-22

## 2025-07-22 RX ORDER — ERGOCALCIFEROL 1.25 MG/1
50000 CAPSULE ORAL
COMMUNITY

## 2025-07-22 RX ORDER — OXYBUTYNIN CHLORIDE 5 MG/1
5 TABLET ORAL DAILY PRN
Qty: 120 TABLET | Refills: 3 | Status: SHIPPED | OUTPATIENT
Start: 2025-07-22 | End: 2026-07-22

## 2025-07-22 NOTE — PROGRESS NOTES
Subjective:       Patient ID: Smooth Powell is a 60 y.o. male.    Chief Complaint: Benign Prostatic Hypertrophy      HPI: 52-year-old male, established patient, presents for yearly visit.    Patient has a history of BPH with obstruction.  He is maintained on tamsulosin 0.4 mg daily.    Patient states he has a good stream.  Denies having strain to void.  Denies any significant frequency or urgency.    Patient has history of nocturia.  He is maintained on oxybutynin 5-10 mg nightly.    Patient states he has had improvement.  He currently complete gets up 1-3 times per night.  In the past he reported up to 5 episodes per night.  He does admit that his fluid consumption may contribute.  Patient has history of erectile dysfunction.    Patient is maintained on tadalafil 20 mg as needed.  States medication is working pretty well with no complaints.      Patient's last PSA 1 year ago was 1.97.  PSA has slowly trended up.  Four years ago PSA was 1.10, 3 years ago 1.27, 2 years ago on 0.48 and last year 1.97.    He denies any unexpected weight loss.  Denies new onset bone pain.    No other urinary complaints at this time.         Past Medical History:   Past Medical History:   Diagnosis Date    BPH with urinary obstruction     Erectile dysfunction        Past Surgical Historical:   Past Surgical History:   Procedure Laterality Date    NASAL SINUS SURGERY      TURBINATE RESECTION      VASECTOMY          Medications:   Medication List with Changes/Refills   Current Medications    AMLODIPINE-BENAZEPRIL (LOTREL) 10-40 MG PER CAPSULE        CLONIDINE (CATAPRES) 0.2 MG TABLET    Take 0.2 mg by mouth 2 (two) times daily.    DUPIXENT  MG/2 ML PNIJ        ERGOCALCIFEROL (VITAMIN D2) 50,000 UNIT CAP    Take 50,000 Units by mouth every 7 days.    NEBIVOLOL (BYSTOLIC) 5 MG TAB        PANTOPRAZOLE (PROTONIX) 40 MG TABLET       Changed and/or Refilled Medications    Modified Medication Previous Medication    OXYBUTYNIN (DITROPAN) 5  MG TAB oxybutynin (DITROPAN) 5 MG Tab       Take 1 tablet (5 mg total) by mouth daily as needed (1-2 tabs daily as needed.).    Take 1 tablet (5 mg total) by mouth daily as needed (1-2 tabs daily as needed.).    TADALAFIL (CIALIS) 20 MG TAB tadalafiL (CIALIS) 20 MG Tab       Take 1 tablet (20 mg total) by mouth daily as needed (ed).    Take 1 tablet (20 mg total) by mouth daily as needed (ed).    TAMSULOSIN (FLOMAX) 0.4 MG CAP tamsulosin (FLOMAX) 0.4 mg Cap       Take 1 capsule (0.4 mg total) by mouth once daily.    Take 1 capsule by mouth once daily        Past Social History: Social History[1]    Allergies: Review of patient's allergies indicates:  No Known Allergies     Family History: No family history on file.     Review of Systems:  Review of Systems   Constitutional:  Negative for activity change and appetite change.   HENT:  Negative for congestion and dental problem.    Eyes:  Negative for visual disturbance.   Respiratory:  Negative for chest tightness and shortness of breath.    Cardiovascular:  Negative for chest pain.   Gastrointestinal:  Negative for abdominal distention and abdominal pain.   Genitourinary:  Negative for decreased urine volume, difficulty urinating, dysuria, enuresis, flank pain, frequency, genital sores, hematuria, penile discharge, penile pain, penile swelling, scrotal swelling, testicular pain and urgency.   Musculoskeletal:  Negative for back pain and neck pain.   Skin:  Negative for color change.   Neurological:  Negative for dizziness.   Hematological:  Negative for adenopathy.   Psychiatric/Behavioral:  Negative for agitation, behavioral problems and confusion.        Physical Exam:  Physical Exam  Vitals and nursing note reviewed.   Constitutional:       Appearance: He is well-developed.   HENT:      Head: Normocephalic.   Eyes:      Pupils: Pupils are equal, round, and reactive to light.   Cardiovascular:      Rate and Rhythm: Normal rate and regular rhythm.      Heart sounds:  Normal heart sounds.   Pulmonary:      Effort: Pulmonary effort is normal.      Breath sounds: Normal breath sounds.   Abdominal:      General: Bowel sounds are normal.      Palpations: Abdomen is soft.   Genitourinary:     Penis: Normal.       Prostate: Enlarged (Prostate is enlarged.  Prostate smooth with no nodules and nontender.  Prostate is symmetrical.).      Rectum: Normal.   Musculoskeletal:         General: Normal range of motion.      Cervical back: Normal range of motion and neck supple.   Skin:     General: Skin is warm and dry.   Neurological:      Mental Status: He is alert and oriented to person, place, and time.   Psychiatric:         Behavior: Behavior normal.       Bladder scan: 35 cc    Assessment/Plan:   1. BPH with obstruction: Check the patient's PSA.  We will notify him of the results.    Patient's bladder scan is stable at 35 cc.  Patient continue tamsulosin 0.4 mg daily.  Refill sent to pharmacy.      2. Nocturia:  Patient reports 1-3 episodes of nocturia per night.  Fluid consumption can contribute.    This time patient will continue oxybutynin 5-10 mg nightly.  Refill sent to pharmacy.    3. Erectile dysfunction: Patient is doing well on tadalafil 20 mg as needed.    Refill sent to pharmacy.    Follow-up 1 year, sooner if needed.  We did discuss repeat PSA in 6 months if his PSA continues to trend up.  Problem List Items Addressed This Visit    None  Visit Diagnoses         BPH with urinary obstruction    -  Primary    Relevant Medications    tamsulosin (FLOMAX) 0.4 mg Cap    Other Relevant Orders    Prostate Specific Antigen, Diagnostic    POCT Bladder Scan      Nocturia        Relevant Medications    oxybutynin (DITROPAN) 5 MG Tab    Other Relevant Orders    POCT Bladder Scan      Erectile dysfunction, unspecified erectile dysfunction type        Relevant Medications    tadalafiL (CIALIS) 20 MG Tab                      [1]   Social History  Socioeconomic History    Marital status:     Tobacco Use    Smoking status: Never     Passive exposure: Never    Smokeless tobacco: Never   Substance and Sexual Activity    Alcohol use: Yes    Drug use: Never    Sexual activity: Yes

## 2025-07-23 ENCOUNTER — TELEPHONE (OUTPATIENT)
Dept: UROLOGY | Facility: CLINIC | Age: 60
End: 2025-07-23
Payer: COMMERCIAL

## 2025-07-23 NOTE — TELEPHONE ENCOUNTER
Notified patient of results             ----- Message from Lupillo Baptiste NP sent at 7/23/2025 10:45 AM CDT -----  PSA is in the normal range and has decreased from 1 year ago.    PSA was 1.97 and is now 1.73  ----- Message -----  From: Agnes Lamb  Sent: 7/22/2025  10:53 AM CDT  To: Lupillo Baptiste NP